# Patient Record
Sex: FEMALE | Race: OTHER
[De-identification: names, ages, dates, MRNs, and addresses within clinical notes are randomized per-mention and may not be internally consistent; named-entity substitution may affect disease eponyms.]

---

## 2022-04-13 VITALS — BODY MASS INDEX: 20.41 KG/M2 | WEIGHT: 127 LBS | HEIGHT: 66 IN

## 2022-04-13 PROBLEM — Z00.00 ENCOUNTER FOR PREVENTIVE HEALTH EXAMINATION: Status: ACTIVE | Noted: 2022-04-13

## 2022-04-20 ENCOUNTER — APPOINTMENT (OUTPATIENT)
Dept: NEUROSURGERY | Facility: CLINIC | Age: 57
End: 2022-04-20
Payer: COMMERCIAL

## 2022-04-20 VITALS
HEIGHT: 66 IN | TEMPERATURE: 98.4 F | DIASTOLIC BLOOD PRESSURE: 80 MMHG | WEIGHT: 127 LBS | BODY MASS INDEX: 20.41 KG/M2 | SYSTOLIC BLOOD PRESSURE: 101 MMHG

## 2022-04-20 DIAGNOSIS — Z78.9 OTHER SPECIFIED HEALTH STATUS: ICD-10-CM

## 2022-04-20 DIAGNOSIS — Z87.891 PERSONAL HISTORY OF NICOTINE DEPENDENCE: ICD-10-CM

## 2022-04-20 DIAGNOSIS — M54.31 SCIATICA, RIGHT SIDE: ICD-10-CM

## 2022-04-20 PROCEDURE — 99203 OFFICE O/P NEW LOW 30 MIN: CPT

## 2022-04-20 RX ORDER — METHYLPREDNISOLONE 4 MG/1
4 TABLET ORAL
Qty: 1 | Refills: 0 | Status: ACTIVE | COMMUNITY
Start: 2022-04-20 | End: 1900-01-01

## 2022-04-20 RX ORDER — NAPROXEN 500 MG/1
500 TABLET ORAL
Qty: 60 | Refills: 1 | Status: ACTIVE | COMMUNITY
Start: 2022-04-20 | End: 1900-01-01

## 2022-04-20 RX ORDER — METHOCARBAMOL 500 MG/1
500 TABLET, FILM COATED ORAL TWICE DAILY
Qty: 14 | Refills: 0 | Status: ACTIVE | COMMUNITY
Start: 2022-04-20 | End: 1900-01-01

## 2022-04-20 NOTE — REASON FOR VISIT
[New Patient Visit] : a new patient visit [Referred By: _________] : Patient was referred by PADDY [FreeTextEntry1] : R thigh numbness n pain since Feb 2022

## 2022-04-20 NOTE — HISTORY OF PRESENT ILLNESS
[< 3 months] : less than 3 months [FreeTextEntry1] : Back pain/right leg radiculopathy [de-identified] : 56-year-old female presents to the neurosurgery office for an initial office visit for evaluation of back pain and right lower extremity radicular symptoms.  The patient states that her symptoms have been present since about February 2022.  She initially saw a chiropractor and orthopedic surgeon and conservative treatment was recommended.  She felt like her symptoms were getting better until approximately 2 weeks ago when she went to lift a heavy object and felt a pop in her back.  Despite going to the chiropractor on these recent visits, her symptoms did not improve and she states have gotten worse.  Radiographs were obtained and reviewed, however nothing available for review today.  She was given a steroid pack back in February 28 for 1 week.  She does state that her leg symptoms are worse than her low back symptoms.  She is having trouble with prolonged sitting and finds more comfort with standing.  The patient is standing for the evaluation today because of comfort.  Her only treatment thus far has been over-the-counter anti-inflammatory medications and CBD with limited relief in symptoms.  The patient states that she is in active person and runs nearly 5 miles each day.  She is upset because she is unable to participate in her usual training routine.  She has no other complaints at present.

## 2022-04-20 NOTE — DATA REVIEWED
[de-identified] : Were reviewed of the lumbar spine - 4/20/2022: No acute fracture/dislocation; good anatomic alignment

## 2022-04-20 NOTE — PHYSICAL EXAM
[General Appearance - Alert] : alert [General Appearance - In No Acute Distress] : in no acute distress [General Appearance - Well Nourished] : well nourished [General Appearance - Well Developed] : well developed [General Appearance - Well-Appearing] : healthy appearing [] : normal voice and communication [Oriented To Time, Place, And Person] : oriented to person, place, and time [Impaired Insight] : insight and judgment were intact [Affect] : the affect was normal [Mood] : the mood was normal [Memory Recent] : recent memory was not impaired [Memory Remote] : remote memory was not impaired [Person] : oriented to person [Place] : oriented to place [Time] : oriented to time [Motor Tone] : muscle tone was normal in all four extremities [Motor Strength] : muscle strength was normal in all four extremities [Involuntary Movements] : no involuntary movements were seen [No Muscle Atrophy] : normal bulk in all four extremities [Straight-Leg Raise Test - Right] : straight leg raise of the right leg was positive [Abnormal Walk] : normal gait [Straight-Leg Raise Test - Left] : straight leg raise of the left leg was negative

## 2022-04-20 NOTE — ASSESSMENT
[FreeTextEntry1] : Discussed the history, physical examination findings, and recent imaging with the patient with all questions answered.  The patient symptoms have been present since February 2022 and it failed to improve with conservative treatment.  Discussed that further evaluation is warranted with an MRI of the lumbar spine which has been ordered today.  Recommend rest and modified activity until the next office visit.  Formal PT prescription provided today.  The patient will follow up after the MRI study is complete to discuss further treatment recommendations.

## 2022-04-25 ENCOUNTER — APPOINTMENT (OUTPATIENT)
Dept: ORTHOPEDIC SURGERY | Facility: CLINIC | Age: 57
End: 2022-04-25

## 2022-04-26 ENCOUNTER — APPOINTMENT (OUTPATIENT)
Dept: NEUROSURGERY | Facility: CLINIC | Age: 57
End: 2022-04-26
Payer: COMMERCIAL

## 2022-04-26 VITALS
WEIGHT: 127 LBS | HEIGHT: 66 IN | TEMPERATURE: 96.6 F | BODY MASS INDEX: 20.41 KG/M2 | SYSTOLIC BLOOD PRESSURE: 92 MMHG | DIASTOLIC BLOOD PRESSURE: 72 MMHG

## 2022-04-26 DIAGNOSIS — M54.16 RADICULOPATHY, LUMBAR REGION: ICD-10-CM

## 2022-04-26 PROCEDURE — 99213 OFFICE O/P EST LOW 20 MIN: CPT

## 2022-04-26 NOTE — REASON FOR VISIT
[Follow-Up: _____] : a [unfilled] follow-up visit [FreeTextEntry1] : \kat Melgar is here for a follow-up evaluation of her lumbar spine.  She had an MRI done at [facility] recently.  She has improvement in her back and leg pain on the right.  She has been taking the medications as prescribed but has not started the anti-inflammatories yet.  She did take the prednisone pack and felt improvement.  She is getting more sleep now and is able to move about ambulate and sit much more comfortably.

## 2022-04-26 NOTE — RESULTS/DATA
[FreeTextEntry1] : HoracioEs MRI was reviewed in detail showing a paracentral disc bulge/herniation at L4-5 on the right.

## 2022-04-26 NOTE — ASSESSMENT
[FreeTextEntry1] : DIAGNOSIS:  LUMBAR DISK HERNIATION  / STENOSIS L45  RIGHT \par TREATMENT PLAN:  NON-SURGICAL  VS    L4-L5   RIGHT   MICRODISKECTOMY \par \par This is a patient with lumbar herniated disk and radiculopathy.   I have recommended nonsurgical management at this time.  This consists of physical therapy and/or manual medicine in conjunction to medical therapy and other conservative methods.  These include the consideration of trigger point injections and or the utilization of modalities such as TENS where applicable.  The next tier would be the referral to a pain management specialist (anesthesia or physiatry) for the consideration of spinal injections.  This includes the options of epidural steroids, facet injections as well as other novel techniques that may provide pain relief.  \par \par If all nonsurgical methods fail , I have recommended lumbar microdiskectomy  as a treatment option.  This entails removing the lamina and the medial facet joint along with the underlying hypertrophied ligamentum flavum and retrieving the herniated disk fragment as well as removing any weakened or damaged disk material at this level.  This will allow for a widening of the spinal canal and the neuroforamen at the effected level thus decompressing the nerve root. This should not result in added instability to the spine at this level.  This will not require the need for instrumented stabilization and fusion. \par \par Risks and benefits of surgery were described to the patient in detail which include but not excluding those otherwise not mentioned:  coma paralysis, death, stroke, spinal fluid leak, nerve injury, weakness, infection, spinal instability, vascular injury, re-herniation, adjacent segment degeneration and persistent pain.   \par \par I have reviewed the images in detail with the patient today in my office and have answered all questions regarding this condition to the best of my ability to the patient’s satisfaction.